# Patient Record
Sex: FEMALE | Race: WHITE
[De-identification: names, ages, dates, MRNs, and addresses within clinical notes are randomized per-mention and may not be internally consistent; named-entity substitution may affect disease eponyms.]

---

## 2018-08-24 ENCOUNTER — HOSPITAL ENCOUNTER (OUTPATIENT)
Dept: HOSPITAL 91 - LAB | Age: 38
Discharge: HOME | End: 2018-08-24
Payer: COMMERCIAL

## 2018-08-24 ENCOUNTER — HOSPITAL ENCOUNTER (OUTPATIENT)
Age: 38
Discharge: HOME | End: 2018-08-24

## 2018-08-24 DIAGNOSIS — O09.519: ICD-10-CM

## 2018-08-24 DIAGNOSIS — Z3A.00: ICD-10-CM

## 2018-08-24 DIAGNOSIS — O20.0: Primary | ICD-10-CM

## 2018-08-24 PROCEDURE — 84144 ASSAY OF PROGESTERONE: CPT

## 2018-08-24 PROCEDURE — 84702 CHORIONIC GONADOTROPIN TEST: CPT

## 2018-08-28 LAB — PROGESTERONE: 56.4 NG/ML

## 2018-08-31 ENCOUNTER — HOSPITAL ENCOUNTER (OUTPATIENT)
Age: 38
Discharge: HOME | End: 2018-08-31

## 2018-08-31 ENCOUNTER — HOSPITAL ENCOUNTER (OUTPATIENT)
Dept: HOSPITAL 91 - LAB | Age: 38
Discharge: HOME | End: 2018-08-31
Payer: COMMERCIAL

## 2018-08-31 DIAGNOSIS — O20.0: Primary | ICD-10-CM

## 2018-08-31 DIAGNOSIS — Z3A.00: ICD-10-CM

## 2018-08-31 PROCEDURE — 84702 CHORIONIC GONADOTROPIN TEST: CPT

## 2019-07-13 ENCOUNTER — HOSPITAL ENCOUNTER (EMERGENCY)
Dept: HOSPITAL 10 - E/R | Age: 39
Discharge: HOME | End: 2019-07-13
Payer: COMMERCIAL

## 2019-07-13 VITALS — SYSTOLIC BLOOD PRESSURE: 138 MMHG | RESPIRATION RATE: 18 BRPM | DIASTOLIC BLOOD PRESSURE: 80 MMHG | HEART RATE: 78 BPM

## 2019-07-13 VITALS
BODY MASS INDEX: 23.71 KG/M2 | BODY MASS INDEX: 23.71 KG/M2 | HEIGHT: 64 IN | WEIGHT: 138.89 LBS | HEIGHT: 64 IN | WEIGHT: 138.89 LBS

## 2019-07-13 DIAGNOSIS — N20.0: Primary | ICD-10-CM

## 2019-07-13 PROCEDURE — 81025 URINE PREGNANCY TEST: CPT

## 2019-07-13 PROCEDURE — 76775 US EXAM ABDO BACK WALL LIM: CPT

## 2019-07-13 PROCEDURE — 80053 COMPREHEN METABOLIC PANEL: CPT

## 2019-07-13 PROCEDURE — 96375 TX/PRO/DX INJ NEW DRUG ADDON: CPT

## 2019-07-13 PROCEDURE — 96374 THER/PROPH/DIAG INJ IV PUSH: CPT

## 2019-07-13 PROCEDURE — 87086 URINE CULTURE/COLONY COUNT: CPT

## 2019-07-13 PROCEDURE — 74176 CT ABD & PELVIS W/O CONTRAST: CPT

## 2019-07-13 PROCEDURE — 85025 COMPLETE CBC W/AUTO DIFF WBC: CPT

## 2019-07-13 PROCEDURE — 81001 URINALYSIS AUTO W/SCOPE: CPT

## 2019-07-16 NOTE — ERD
ER Documentation


Chief Complaint


Chief Complaint





Flank pain started this morning





HPI


This is a 38-year-old female with a past medical history that presents to the 


emergency department complaining of a sudden onset of right flank pain.  She 


stated it began this morning.  She went to work but the pain still persisted.  


Progressively worsened.  It was intermittent and a sharp shooting pain.  She 


denies any hematuria.  No frequency urgency or dysuria.  No fevers or shaking no


chills.  No chest pain no no shortness of breath.  No pain in the lower abdomen.





ROS


All systems reviewed and are negative except as per history of present illness.





Medications


Home Meds


Active Scripts


Ibuprofen* (Motrin*) 800 Mg Tab, 800 MG PO Q6H PRN for PAIN AND OR ELEVATED 


TEMP, #30 TAB


   Prov:MICHAEL SALAS MD         19


Docusate Sodium* (Colace*) 100 Mg Capsule, 100 MG PO TID, #30 CAP


   Prov:MICHAEL SALAS MD         19


Hydrocodone/Acetaminophen (Norco 5-325 Tablet) 1 Each Tablet, 1 TAB PO Q6H PRN 


for PAIN, #20 TAB


   Prov:MICHAEL SALAS MD         19


Ciprofloxacin Hcl* (Ciprofloxacin Hcl*) 500 Mg Tablet, 500 MG PO BID for 10 


Days, TAB


   Prov:MICHAEL SALAS MD         19


Reported Medications


Spironolactone* (Aldactone*) 50 Mg Tablet, 50 MG PO DAILY, #30 TAB


   19





Allergies


Allergies:  


Coded Allergies:  


     cefazolin (Verified  Allergy, Unknown, rash, 19)





PMhx/Soc


Medical and Surgical Hx:  pt denies Medical Hx, pt denies Surgical Hx


Hx Alcohol Use:  No


Hx Substance Use:  No


Hx Tobacco Use:  No


Smoking Status:  Never smoker





Physical Exam


Vitals





Vital Signs


  Date      Temp  Pulse  Resp  B/P (MAP)   Pulse Ox  O2          O2 Flow    FiO2


Time                                                 Delivery    Rate


   19  97.7     78    18      138/80        99  Room Air


     11:15                           (99)


   19  97.7     67    18      130/83        97


     09:27                           (99)





Physical Exam


Const:   No acute distress


Head:   Atraumatic 


Eyes:    Normal Conjunctiva


ENT:    Normal External Ears, Nose and Mouth.


Neck:               Full range of motion. No meningismus.


Resp:   Clear to auscultation bilaterally


Cardio:   Regular rate and rhythm, no murmurs


Abd:    Soft, non tender, non distended. Normal bowel sounds


Skin:   No petechiae or rashes


Back:   No midline or flank tenderness


Ext:    No cyanosis, or edema


Neur:   Awake and alert


Psych:    Normal Mood and Affect


Result Diagram:  


1951                                                                    


           1951





Results 24 hrs





Laboratory Tests


Test
                               19
09:31  19
09:41   19
09:49


Urine Color                       YELLOW


Urine Clarity                     CLOUDY


Urine pH                                     5.0


Urine Specific Gravity                     1.028


Urine Ketones                     NEGATIVE mg/dL


Urine Nitrite                     NEGATIVE mg/dL


Urine Bilirubin                   NEGATIVE mg/dL


Urine Urobilinogen                NEGATIVE mg/dL


Urine Leukocyte Esterase
         NEGATIVE
Jonathan/ul  
              



Urine Microscopic RBC                   107 /HPF


Urine Microscopic WBC                     5 /HPF


Urine Squamous Epithelial
Cells   FEW /HPF 
       
              



Urine Bacteria                    FEW /HPF


Urine Mucus                       MANY /HPF


Urine Hemoglobin                        3+ mg/dL


Urine Glucose                     NEGATIVE mg/dL


Urine Total Protein               NEGATIVE mg/dl


POC Beta HCG, Qualitative                          NEGATIVE


White Blood Count                                                   9.0 10^3/ul


Red Blood Count                                                    4.31 10^6/ul


Hemoglobin                                                            13.2 g/dl


Hematocrit                                                               39.3 %


Mean Corpuscular Volume                                                 91.2 fl


Mean Corpuscular Hemoglobin                                             30.6 pg


Mean Corpuscular                  
                
                 33.6 g/dl 



Hemoglobin
Concent


Red Cell Distribution Width                                              11.9 %


Platelet Count                                                      335 10^3/UL


Mean Platelet Volume                                                     9.3 fl


Immature Granulocytes %                                                 0.200 %


Neutrophils %                                                            82.4 %


Lymphocytes %                                                            12.4 %


Monocytes %                                                               4.1 %


Eosinophils %                                                             0.3 %


Basophils %                                                               0.6 %


Nucleated Red Blood Cells %                                         0.0 /100WBC


Immature Granulocytes #                                           0.020 10^3/ul


Neutrophils #                                                       7.5 10^3/ul


Lymphocytes #                                                       1.1 10^3/ul


Monocytes #                                                         0.4 10^3/ul


Eosinophils #                                                       0.0 10^3/ul


Basophils #                                                         0.1 10^3/ul


Nucleated Red Blood Cells #                                         0.0 10^3/ul


Sodium Level                                                         142 mmol/L


Potassium Level                                                      4.5 mmol/L


Chloride Level                                                       107 mmol/L


Carbon Dioxide Level                                                  25 mmol/L


Anion Gap                                                                    10


Blood Urea Nitrogen                                                    17 mg/dl


Creatinine                                                           0.62 mg/dl


Est Glomerular Filtrat            
                
              > 60 mL/min 



Rate
mL/min


Glucose Level                                                         119 mg/dl


Calcium Level                                                         9.4 mg/dl


Total Bilirubin                                                       0.5 mg/dl


Direct Bilirubin                                                     0.00 mg/dl


Indirect Bilirubin                                                    0.5 mg/dl


Aspartate Amino                   
                
                   22 IU/L 



Transf
(AST/SGOT)


Alanine                           
                
                   23 IU/L 



Aminotransferase
(ALT/SGPT)


Alkaline Phosphatase                                                    79 IU/L


Total Protein                                                          8.0 g/dl


Albumin                                                                4.3 g/dl


Globulin                                                              3.70 g/dl


Albumin/Globulin Ratio                                                     1.16





Current Medications


 Medications
   Dose
          Sig/Rhianna
       Start Time
   Status  Last


 (Trade)       Ordered        Route
 PRN     Stop Time              Admin
Dose


                              Reason                                Admin


 Sodium         1,000 ml @ 
   Q1H STAT
      19       DC           19


Chloride       1,000 mls/hr   IV
            09:
                       10:10



                                             19 10:18


 Ketorolac
     30 mg          ONCE  STAT
    19       DC           19


Tromethamine
                 IV
            09:
                       10:10



 (Toradol)                                   19 09:22


 Morphine       4 mg           ONCE  STAT
    19       DC           19


Sulfate
                      IV
            11:31
                       12:10



(morphine)                                   19 11:32


 Ondansetron    4 mg           ONCE  STAT
    19       DC           19


HCl
  (Zofran                 IV
            11:31
                       12:10



Inj)                                         19 11:32








Procedures/MDM


The patient presented to the emergency department with flank pain. My 


differential diagnosis included but was not limited to spinal origins of the 


pain such as fracture, osteomyelitis, epidural abscess, neoplasm, 


spondylolishtesis, discogenic, cauda equina syndrome or musculoligamentous. Non


spinal causes such as AAA, upper UTI, renal colic, aortic dissection, abdominal 


neoplasm were also considered as an etiology into their pain.





The patient's physical exam findings did appear to be a result of a likely 


kidney stone.  Therefore obtain an ultrasound of the kidneys and the patient had


mild right hydronephrosis.  At this time I discussed with the patient further 


imaging and she did agree to undergo a CT scan.  The CT scan without contrast of


the pelvis was reviewed by the radiologist and  myself and indicate the 


followin. ENLARGED RIGHT KIDNEY WITH RIGHT-SIDED HYDROURETERONEPHROSIS. 4.2 MM STONE 


IS NOTED WITHIN THE BLADDER ADJACENT TO THE RIGHT UVJ. FINDINGS ARE MOST CONSIST


ENT WITH A RECENTLY PASSED STONE.


2.  Multiple left-sided nonobstructing renal stones. The largest measures up to 


3.4 mm. No evidence of obstructive uropathy.


3.  No evidence of bowel obstruction. The appendix is within normal limits.





The patient had no renal failure on ancillary laboratory work.  The patient 


initially was given IV fluids Toradol.  However her pain did not improve.  


Therefore at this time she was given intravenous morphine and Zofran.  She felt 


comfortable being discharged home with analgesic medication.  The patient was 


discharged home in fair condition. They were instructed to return to the 


emergency department at any time if there was any worsening of their condition. 


The patient stated they would follow up with their PCP in the next 24-48 hours 


to initiate a suitable medication regimen under the care of their PCP as well as


to allow their PCP to monitor any drug reactions. The patient was discharged 


home with prescriptions after they gave informed consent to the new medication. 


They were also fully informed by myself on the adverse effects and adverse drug 


interactions in order to provide adequate safeguards to prevent possible adverse


reactions to medications.





Departure


Diagnosis:  


   Primary Impression:  


   Flank pain


   Additional Impression:  


   Nephrolithiasis


Condition:  Fair


Patient Instructions:  Kidney Stone W/ Colic











MICHAEL SALAS MD            2019 06:39